# Patient Record
Sex: MALE | Race: WHITE | Employment: FULL TIME | URBAN - METROPOLITAN AREA
[De-identification: names, ages, dates, MRNs, and addresses within clinical notes are randomized per-mention and may not be internally consistent; named-entity substitution may affect disease eponyms.]

---

## 2017-01-13 RX ORDER — DESVENLAFAXINE SUCCINATE 100 MG/1
TABLET, EXTENDED RELEASE ORAL
Qty: 30 TAB | Refills: 5 | Status: SHIPPED | OUTPATIENT
Start: 2017-01-13 | End: 2017-06-14

## 2017-03-19 DIAGNOSIS — F90.0 ATTENTION DEFICIT HYPERACTIVITY DISORDER (ADHD), PREDOMINANTLY INATTENTIVE TYPE: Chronic | ICD-10-CM

## 2017-03-21 RX ORDER — ATOMOXETINE HYDROCHLORIDE 80 MG/1
CAPSULE ORAL
Qty: 30 CAP | Refills: 5 | Status: SHIPPED | OUTPATIENT
Start: 2017-03-21 | End: 2017-06-14

## 2017-06-14 ENCOUNTER — OFFICE VISIT (OUTPATIENT)
Dept: FAMILY MEDICINE CLINIC | Age: 27
End: 2017-06-14

## 2017-06-14 VITALS
BODY MASS INDEX: 32.51 KG/M2 | RESPIRATION RATE: 14 BRPM | OXYGEN SATURATION: 96 % | HEIGHT: 72 IN | TEMPERATURE: 98.8 F | WEIGHT: 240 LBS | DIASTOLIC BLOOD PRESSURE: 95 MMHG | HEART RATE: 87 BPM | SYSTOLIC BLOOD PRESSURE: 134 MMHG

## 2017-06-14 DIAGNOSIS — F41.8 MIXED ANXIETY DEPRESSIVE DISORDER: ICD-10-CM

## 2017-06-14 DIAGNOSIS — F90.0 ATTENTION DEFICIT HYPERACTIVITY DISORDER (ADHD), PREDOMINANTLY INATTENTIVE TYPE: Primary | Chronic | ICD-10-CM

## 2017-06-14 RX ORDER — SERTRALINE HYDROCHLORIDE 100 MG/1
100 TABLET, FILM COATED ORAL DAILY
Qty: 30 TAB | Refills: 5 | Status: SHIPPED | OUTPATIENT
Start: 2017-06-14 | End: 2022-05-16

## 2017-06-14 RX ORDER — SERTRALINE HYDROCHLORIDE 100 MG/1
TABLET, FILM COATED ORAL DAILY
COMMUNITY
End: 2017-06-14 | Stop reason: SDUPTHER

## 2017-06-14 RX ORDER — CLONAZEPAM 2 MG/1
TABLET ORAL 3 TIMES DAILY
COMMUNITY
End: 2022-05-16

## 2017-06-14 NOTE — PROGRESS NOTES
Venkat Cross is a 32 y.o. male who was seen in clinic today (6/16/2017). Subjective: Anxiety  Patient seen for follow up of anxiety and depressive disorder and ADD. Patient previously seen by psychiatry but is searching for a new psychiatrist. He has the following symptoms: racing thoughts, psychomotor agitation, feelings of losing control, difficulty concentrating, weight gain, hypersomnia and fatigue. Onset of symptoms was approximately several years ago, unchanged since that time. He requests stimulant medication for ADD. Also requests refills of Clonazepam and Sertraline. Patient has a h/o marijuana use. Prior to Admission medications    Medication Sig Start Date End Date Taking? Authorizing Provider   clonazePAM (KLONOPIN) 2 mg tablet Take  by mouth three (3) times daily. Yes Historical Provider   sertraline (ZOLOFT) 100 mg tablet Take 1 Tab by mouth daily. For depression 6/14/17  Yes Montana Hill NP   QUEtiapine (SEROQUEL) 25 mg tablet Take 1 Tab by mouth nightly. For sleep 6/8/16  Yes Marva Alfonso MD          No Known Allergies        ROS  See HPI    Objective:   Physical Exam   Constitutional: He is oriented to person, place, and time. He appears well-developed and well-nourished. Cardiovascular: Normal rate, regular rhythm and intact distal pulses. Exam reveals no gallop and no friction rub. No murmur heard. Pulmonary/Chest: Effort normal and breath sounds normal. No respiratory distress. Neurological: He is alert and oriented to person, place, and time. Psychiatric: He has a normal mood and affect. His speech is normal and behavior is normal. Thought content normal.   Nursing note and vitals reviewed.         Visit Vitals    BP (!) 134/95 (BP 1 Location: Right arm, BP Patient Position: Sitting)    Pulse 87    Temp 98.8 °F (37.1 °C) (Oral)    Resp 14    Ht 6' (1.829 m)    Wt 240 lb (108.9 kg)    SpO2 96%    BMI 32.55 kg/m2       Assessment & Plan:  Lance Luu was seen today for attention deficit disorder and other. Diagnoses and all orders for this visit:    Attention deficit hyperactivity disorder (ADHD), predominantly inattentive type  Patient needs formal neuropsychological testing. Given his psychiatric and substance abuse history with request psychiatry to manage his medications.   -     REFERRAL TO PSYCHIATRY  -     REFERRAL TO PSYCHOLOGY    Mixed anxiety depressive disorder  Discussed that benzodiazepines were not a long term treatment option for anxiety. Will refill Sertraline but not Clonazepam.   -     REFERRAL TO PSYCHIATRY  -     sertraline (ZOLOFT) 100 mg tablet; Take 1 Tab by mouth daily. For depression      I have discussed the diagnosis with the patient and the intended plan as seen in the above orders. The patient has received an after-visit summary along with patient information handout. I have discussed medication side effects and warnings with the patient as well. Follow-up Disposition:  Return if symptoms worsen or fail to improve.         Del Vaughn NP

## 2017-06-14 NOTE — MR AVS SNAPSHOT
Visit Information Date & Time Provider Department Dept. Phone Encounter #  
 6/14/2017  5:00 PM Yumi Pepe  [facility] [address] [phone] [id number] Upcoming Health Maintenance Date Due INFLUENZA AGE 9 TO ADULT 8/1/2017 DTaP/Tdap/Td series (7 - Td) 6/30/2019 Allergies as of 6/14/2017  Review Complete On: 6/14/2017 By: Yumi Pepe NP No Known Allergies Current Immunizations  Reviewed on 6/8/2016 Name Date DTAP Vaccine 8/12/1996, 4/14/1992, 7/2/1991, 4/30/1991, 2/26/1991 HIB Vaccine 5/1/2002, 11/29/2001, 10/24/2001 IPV 8/12/1996, 4/14/1992, 4/30/1991, 2/26/1991 Influenza Vaccine Whole 10/26/2006 MMR Vaccine 8/12/1996, 4/14/1992 Meningococcal Vaccine 6/12/2009 TDAP Vaccine 6/30/2009 Not reviewed this visit You Were Diagnosed With   
  
 Codes Comments Attention deficit hyperactivity disorder (ADHD), predominantly inattentive type    -  Primary ICD-10-CM: F90.0 ICD-9-CM: 314.00 Mixed anxiety depressive disorder     ICD-10-CM: F41.8 ICD-9-CM: 300.4 Vitals BP Pulse Temp Resp Height(growth percentile) Weight(growth percentile) (!) 134/95 (BP 1 Location: Right arm, BP Patient Position: Sitting) 87 98.8 °F (37.1 °C) (Oral) 14 6' (1.829 m) 240 lb (108.9 kg) SpO2 BMI Smoking Status 96% 32.55 kg/m2 Current Every Day Smoker Vitals History BMI and BSA Data Body Mass Index Body Surface Area 32.55 kg/m 2 2.35 m 2 Preferred Pharmacy Pharmacy Name Phone [id number], [address] [place] [address] [phone] Your Updated Medication List  
  
   
This list is accurate as of: 6/14/17  6:16 PM.  Always use your most recent med list.  
  
  
  
  
 KlonoPIN 2 mg tablet Generic drug:  clonazePAM  
Take  by mouth three (3) times daily. QUEtiapine 25 mg tablet Commonly known as:  SEROquel Take 1 Tab by mouth nightly. For sleep  
  
 sertraline 100 mg tablet Commonly known as:  ZOLOFT Take 1 Tab by mouth daily. For depression Prescriptions Sent to Pharmacy Refills  
 sertraline (ZOLOFT) 100 mg tablet 5 Sig: Take 1 Tab by mouth daily. For depression Class: Normal  
 [facility], [address] [facility] #: [phone] Route: Oral  
  
We Performed the Following REFERRAL TO PSYCHIATRY [REF91 Custom] Comments:  
 Please evaluate patient for ADD, anxiety and depression. REFERRAL TO PSYCHOLOGY [QRU84 Custom] Comments:  
 Please evaluate patient for ADD. Referral Information Referral ID Referred By Referred To  
  
 [id number] Abner Pink MD [address] [facility] Corby Villalba 33 Phone: [phone] Fax: [phone] Visits Status Start Date End Date 1 New Request 6/14/17 6/14/18 If your referral has a status of pending review or denied, additional information will be sent to support the outcome of this decision. Referral ID Referred By Referred To  
 [id number] [place], [address] Loni Prescott, PHD  
   [address] [place] [address] Phone: [phone] Fax: [phone] Visits Status Start Date End Date 1 New Request 6/14/17 6/14/18 If your referral has a status of pending review or denied, additional information will be sent to support the outcome of this decision. Patient Instructions Attention Deficit Hyperactivity Disorder (ADHD) in Adults: Care Instructions Your Care Instructions Attention deficit hyperactivity disorder, or ADHD, is a condition that makes it hard to pay attention. So you may have problems when you try to focus, get organized, and finish tasks.  It might make you more active than other people. Or you might do things without thinking first. 
ADHD is very common. It usually starts in early childhood. Many adults don't realize they have it until their children are diagnosed. Then they become aware of their own symptoms. Doctors don't know what causes ADHD. But it often runs in families. ADHD can be treated with medicines, behavior training, and counseling. Treatment can improve your life. Follow-up care is a key part of your treatment and safety. Be sure to make and go to all appointments, and call your doctor if you are having problems. It's also a good idea to know your test results and keep a list of the medicines you take. How can you care for yourself at home? · Learn all you can about ADHD. This will help you and your family understand it better. · Take your medicines exactly as prescribed. Call your doctor if you think you are having a problem with your medicine. You will get more details on the specific medicines your doctor prescribes. · If you miss a dose of your medicine, do not take an extra dose. · If your doctor suggests counseling, find a counselor you like and trust. Talk openly and honestly. Be willing to make some changes. · Find a support group for adults with ADHD. Talking to others with the same problems can help you feel better. It can also give you ideas about how to best cope with the condition. · Get rid of distractions at your work space. Keep your desk clean. Try not to face a window or busy hallway. · Use files, planners, and other tools to keep you organized. · Limit use of alcohol, and do not use illegal drugs. People with ADHD tend to become addicted more easily than others. Tell your doctor if you need help to quit. Counseling, support groups, and sometimes medicines can help you stay free of alcohol or drugs. · Get at least 30 minutes of physical activity on most days of the week. Exercise has been shown to help people cope with ADHD. Walking is a good choice. You also may want to do other activities, such as running, swimming, cycling, or playing tennis or team sports. When should you call for help? Watch closely for changes in your health, and be sure to contact your doctor if: 
· You feel sad a lot or cry all the time. · You have trouble sleeping, or you sleep too much. · You find it hard to concentrate, make decisions, or remember things. · You change how you normally eat. · You feel guilty for no reason. Where can you learn more? Go to http://yury-joo.info/. Enter B196 in the search box to learn more about \"Attention Deficit Hyperactivity Disorder (ADHD) in Adults: Care Instructions. \" Current as of: July 26, 2016 Content Version: 11.2 © 3798-1354 [employer]. Care instructions adapted under license by [employer] (which disclaims liability or warranty for this information). If you have questions about a medical condition or this instruction, always ask your healthcare professional. [address] any warranty or liability for your use of this information. Introducing [facility] & HEALTH SERVICES! Dear Jessenia Leon: Thank you for requesting a [employer] account. Our records indicate that you have previously registered for a [employer] account but its currently inactive. Please call our [employer] support line at [phone]. Additional Information If you have questions, please visit the Frequently Asked Questions section of the [employer] website at [email or web address] Remember, [employer] is NOT to be used for urgent needs. For medical emergencies, dial 911. Now available from your iPhone and Android! Please provide this summary of care documentation to your next provider. Your primary care clinician is listed as Joel Bray.  If you have any questions after today's visit, please call [phone].

## 2017-06-14 NOTE — PROGRESS NOTES
Chief Complaint   Patient presents with    Attention Deficit Disorder    Other     Pt states that he got new insurance in january, seen psychiatrist at [facility] 5/2017     Pt states he has not been able to concentrate at work due to his add  Med list reviewed  1. Have you been to the ER, urgent care clinic since your last visit? Hospitalized since your last visit? No    2. Have you seen or consulted any other health care providers outside of the [address] [place] [address] since your last visit? Include any pap smears or colon screening.  Yes When: 5/2017 Dr. Moshe De La O at [address] [facility] DOCUMENTATION\"

## 2017-06-14 NOTE — PATIENT INSTRUCTIONS
Attention Deficit Hyperactivity Disorder (ADHD) in Adults: Care Instructions  Your Care Instructions  Attention deficit hyperactivity disorder, or ADHD, is a condition that makes it hard to pay attention. So you may have problems when you try to focus, get organized, and finish tasks. It might make you more active than other people. Or you might do things without thinking first.  ADHD is very common. It usually starts in early childhood. Many adults don't realize they have it until their children are diagnosed. Then they become aware of their own symptoms. Doctors don't know what causes ADHD. But it often runs in families. ADHD can be treated with medicines, behavior training, and counseling. Treatment can improve your life. Follow-up care is a key part of your treatment and safety. Be sure to make and go to all appointments, and call your doctor if you are having problems. It's also a good idea to know your test results and keep a list of the medicines you take. How can you care for yourself at home? · Learn all you can about ADHD. This will help you and your family understand it better. · Take your medicines exactly as prescribed. Call your doctor if you think you are having a problem with your medicine. You will get more details on the specific medicines your doctor prescribes. · If you miss a dose of your medicine, do not take an extra dose. · If your doctor suggests counseling, find a counselor you like and trust. Talk openly and honestly. Be willing to make some changes. · Find a support group for adults with ADHD. Talking to others with the same problems can help you feel better. It can also give you ideas about how to best cope with the condition. · Get rid of distractions at your work space. Keep your desk clean. Try not to face a window or busy hallway. · Use files, planners, and other tools to keep you organized. · Limit use of alcohol, and do not use illegal drugs.  People with ADHD tend to become addicted more easily than others. Tell your doctor if you need help to quit. Counseling, support groups, and sometimes medicines can help you stay free of alcohol or drugs. · Get at least 30 minutes of physical activity on most days of the week. Exercise has been shown to help people cope with ADHD. Walking is a good choice. You also may want to do other activities, such as running, swimming, cycling, or playing tennis or team sports. When should you call for help? Watch closely for changes in your health, and be sure to contact your doctor if:  · You feel sad a lot or cry all the time. · You have trouble sleeping, or you sleep too much. · You find it hard to concentrate, make decisions, or remember things. · You change how you normally eat. · You feel guilty for no reason. Where can you learn more? Go to http://yury-joo.info/. Enter B196 in the search box to learn more about \"Attention Deficit Hyperactivity Disorder (ADHD) in Adults: Care Instructions. \"  Current as of: July 26, 2016  Content Version: 11.2  © 9978-7510 [employer]. Care instructions adapted under license by [employer] (which disclaims liability or warranty for this information). If you have questions about a medical condition or this instruction, always ask your healthcare professional. [address] any warranty or liability for your use of this information.

## 2017-12-06 ENCOUNTER — OFFICE VISIT (OUTPATIENT)
Dept: FAMILY MEDICINE CLINIC | Age: 27
End: 2017-12-06

## 2017-12-06 VITALS
OXYGEN SATURATION: 96 % | BODY MASS INDEX: 31.29 KG/M2 | TEMPERATURE: 100.6 F | RESPIRATION RATE: 16 BRPM | WEIGHT: 231 LBS | HEIGHT: 72 IN | SYSTOLIC BLOOD PRESSURE: 120 MMHG | HEART RATE: 88 BPM | DIASTOLIC BLOOD PRESSURE: 97 MMHG

## 2017-12-06 DIAGNOSIS — B34.9 VIRAL INFECTION: Primary | ICD-10-CM

## 2017-12-06 LAB
QUICKVUE INFLUENZA TEST: NEGATIVE
S PYO AG THROAT QL: NEGATIVE
VALID INTERNAL CONTROL?: YES
VALID INTERNAL CONTROL?: YES

## 2017-12-06 RX ORDER — AMOXICILLIN 875 MG/1
875 TABLET, FILM COATED ORAL 2 TIMES DAILY
Qty: 20 TAB | Refills: 0 | Status: SHIPPED | OUTPATIENT
Start: 2017-12-06 | End: 2017-12-16

## 2017-12-06 NOTE — PROGRESS NOTES
Chief Complaint   Patient presents with    Cough     persistent cough- coughing up yellow mucus- started 3 days ago- tried OTC Robitussin- uneffective    Sore Throat     started 3 days ago    Nasal Congestion     runny, stuffy nose- started 3 days ago       1. Have you been to the ER, urgent care clinic since your last visit? Hospitalized since your last visit? No    2. Have you seen or consulted any other health care providers outside of the [address] [place] [address] since your last visit? Include any pap smears or colon screening.  No

## 2017-12-06 NOTE — MR AVS SNAPSHOT
Visit Information Date & Time Provider Department Dept. Phone Encounter #  
 12/6/2017  2:45 PM Guru Cuevas, [address] [facility] [address] [phone] [id number] Upcoming Health Maintenance Date Due DTaP/Tdap/Td series (7 - Td) 6/30/2019 Allergies as of 12/6/2017  Review Complete On: 12/6/2017 By: Jadyn Ortiz LPN No Known Allergies Current Immunizations  Reviewed on 6/8/2016 Name Date DTAP Vaccine 8/12/1996, 4/14/1992, 7/2/1991, 4/30/1991, 2/26/1991 HIB Vaccine 5/1/2002, 11/29/2001, 10/24/2001 IPV 8/12/1996, 4/14/1992, 4/30/1991, 2/26/1991 Influenza Vaccine Whole 10/26/2006 MMR Vaccine 8/12/1996, 4/14/1992 Meningococcal Vaccine 6/12/2009 TDAP Vaccine 6/30/2009 Not reviewed this visit You Were Diagnosed With   
  
 Codes Comments Viral infection    -  Primary ICD-10-CM: B34.9 ICD-9-CM: 079.99 Vitals BP Pulse Temp Resp Height(growth percentile) Weight(growth percentile) (!) 120/97 (BP 1 Location: Right arm, BP Patient Position: Sitting) 88 (!) 100.6 °F (38.1 °C) (Oral) 16 6' (1.829 m) 231 lb (104.8 kg) SpO2 BMI Smoking Status 96% 31.33 kg/m2 Current Every Day Smoker Vitals History BMI and BSA Data Body Mass Index Body Surface Area  
 31.33 kg/m 2 2.31 m 2 Preferred Pharmacy [id number], [address] [place] [address] [phone] Your Updated Medication List  
  
   
This list is accurate as of: 12/6/17  3:33 PM.  Always use your most recent med list.  
  
  
  
  
 amoxicillin 875 mg tablet Commonly known as:  AMOXIL Take 1 Tab by mouth two (2) times a day for 10 days. KlonoPIN 2 mg tablet Generic drug:  clonazePAM  
Take  by mouth three (3) times daily. QUEtiapine 25 mg tablet Commonly known as:  SEROquel Take 1 Tab by mouth nightly. For sleep sertraline 100 mg tablet Commonly known as:  ZOLOFT Take 1 Tab by mouth daily. For depression Prescriptions Printed Refills  
 amoxicillin (AMOXIL) 875 mg tablet 0 Sig: Take 1 Tab by mouth two (2) times a day for 10 days. Class: Print Route: Oral  
  
We Performed the Following AMB POC RAPID INFLUENZA TEST [22564 CPT(R)] AMB POC RAPID STREP A [34768 CPT(R)] Patient Instructions Viral Infections: Care Instructions Your Care Instructions You don't feel well, but it's not clear what's causing it. You may have a viral infection. Viruses cause many illnesses, such as the common cold, influenza, fever, rashes, and the diarrhea, nausea, and vomiting that are often called \"stomach flu. \" You may wonder if antibiotic medicines could make you feel better. But antibiotics only treat infections caused by bacteria. They don't work on viruses. The good news is that viral infections usually aren't serious. Most will go away in a few days without medical treatment. In the meantime, there are a few things you can do to make yourself more comfortable. Follow-up care is a key part of your treatment and safety. Be sure to make and go to all appointments, and call your doctor if you are having problems. It's also a good idea to know your test results and keep a list of the medicines you take. How can you care for yourself at home? · Get plenty of rest if you feel tired. · Take an over-the-counter pain medicine if needed, such as acetaminophen (Tylenol), ibuprofen (Advil, Motrin), or naproxen (Aleve). Read and follow all instructions on the label. · Be careful when taking over-the-counter cold or flu medicines and Tylenol at the same time. Many of these medicines have acetaminophen, which is Tylenol. Read the labels to make sure that you are not taking more than the recommended dose. Too much acetaminophen (Tylenol) can be harmful. · Drink plenty of fluids, enough so that your urine is light yellow or clear like water. If you have kidney, heart, or liver disease and have to limit fluids, talk with your doctor before you increase the amount of fluids you drink. · Stay home from work, school, and other public places while you have a fever. When should you call for help? Call 911 anytime you think you may need emergency care. For example, call if: 
? · You have severe trouble breathing. ? · You passed out (lost consciousness). ?Call your doctor now or seek immediate medical care if: 
? · You seem to be getting much sicker. ? · You have a new or higher fever. ? · You have blood in your stools. ? · You have new belly pain, or your pain gets worse. ? · You have a new rash. ? Watch closely for changes in your health, and be sure to contact your doctor if: 
? · You start to get better and then get worse. ? · You do not get better as expected. Where can you learn more? Go to http://yury-joo.info/. Enter R919 in the search box to learn more about \"Viral Infections: Care Instructions. \" Current as of: March 3, 2017 Content Version: 11.4 © 6947-2233 Engiver. Care instructions adapted under license by [employer] (which disclaims liability or warranty for this information). If you have questions about a medical condition or this instruction, always ask your healthcare professional. [place] [address] any warranty or liability for your use of this information. Introducing [facility] & HEALTH SERVICES! Dear Mary Anne Coppola: Thank you for requesting a [employer] account. Our records indicate that you have previously registered for a [employer] account but its currently inactive. Please call our [employer] support line at [phone]. Additional Information If you have questions, please visit the Frequently Asked Questions section of the [employer] website at [email or web address] Remember, [employer] is NOT to be used for urgent needs. For medical emergencies, dial 911. Now available from your iPhone and Android! Please provide this summary of care documentation to your next provider. Your primary care clinician is listed as Carolee Correa. If you have any questions after today's visit, please call [phone].

## 2017-12-06 NOTE — PROGRESS NOTES
Tiffany Owusu is a 32 y.o. male who was seen in clinic today (12/6/2017). Subjective:  Upper Respiratory Infection  Patient complains of symptoms of a URI. Symptoms include congestion, fever and cough. Onset of symptoms was 4 days ago, gradually worsening since that time. He also c/o achiness, congestion, coryza, cough described as non-productive, without wheezing, dyspnea or hemoptysis, low grade fever, post nasal drip, purulent nasal discharge and sore throat for the past 4 days . He is drinking plenty of fluids. . Evaluation to date: none. Treatment to date: none. Prior to Admission medications    Medication Sig Start Date End Date Taking? Authorizing Provider   amoxicillin (AMOXIL) 875 mg tablet Take 1 Tab by mouth two (2) times a day for 10 days. 12/6/17 12/16/17 Yes Cheryl Castanon NP   clonazePAM (KLONOPIN) 2 mg tablet Take  by mouth three (3) times daily. Historical Provider   sertraline (ZOLOFT) 100 mg tablet Take 1 Tab by mouth daily. For depression 6/14/17   Cheryl Castanon NP   QUEtiapine (SEROQUEL) 25 mg tablet Take 1 Tab by mouth nightly. For sleep 6/8/16   Jassi Aden MD          No Known Allergies        ROS  See HPI    Objective:   Physical Exam   Constitutional: He is oriented to person, place, and time. He appears well-developed and well-nourished. No distress. HENT:   Right Ear: Tympanic membrane and ear canal normal.   Left Ear: Tympanic membrane and ear canal normal.   Nose: Mucosal edema present. Right sinus exhibits no maxillary sinus tenderness and no frontal sinus tenderness. Left sinus exhibits no maxillary sinus tenderness and no frontal sinus tenderness. Mouth/Throat: Oropharynx is clear and moist.   Cardiovascular: Normal rate, regular rhythm and normal heart sounds. No murmur heard. Pulmonary/Chest: Effort normal and breath sounds normal. He has no decreased breath sounds. He has no wheezes. He has no rhonchi.    Lymphadenopathy:     He has no cervical adenopathy. Neurological: He is alert and oriented to person, place, and time. Psychiatric: He has a normal mood and affect. His behavior is normal.   Nursing note and vitals reviewed. Visit Vitals    BP (!) 120/97 (BP 1 Location: Right arm, BP Patient Position: Sitting)    Pulse 88    Temp (!) 100.6 °F (38.1 °C) (Oral)    Resp 16    Ht 6' (1.829 m)    Wt 231 lb (104.8 kg)    SpO2 96%    BMI 31.33 kg/m2       Assessment & Plan:  Diagnoses and all orders for this visit:    1. Viral infection  Discussed that symptoms were viral and recommended treating symptoms. Increase fluids and rest. Reviewed OTC products that patient could take. -     AMB POC RAPID STREP A: negative  -     AMB POC RAPID INFLUENZA TEST: negative  -     Delay fill amoxicillin (AMOXIL) 875 mg tablet; Take 1 Tab by mouth two (2) times a day for 10 days. I have discussed the diagnosis with the patient and the intended plan as seen in the above orders. The patient has received an after-visit summary along with patient information handout. I have discussed medication side effects and warnings with the patient as well. Follow-up Disposition:  Return if symptoms worsen or fail to improve.         Eliezer Napoles, BERTRAM

## 2017-12-06 NOTE — PATIENT INSTRUCTIONS
Viral Infections: Care Instructions  Your Care Instructions    You don't feel well, but it's not clear what's causing it. You may have a viral infection. Viruses cause many illnesses, such as the common cold, influenza, fever, rashes, and the diarrhea, nausea, and vomiting that are often called \"stomach flu. \" You may wonder if antibiotic medicines could make you feel better. But antibiotics only treat infections caused by bacteria. They don't work on viruses. The good news is that viral infections usually aren't serious. Most will go away in a few days without medical treatment. In the meantime, there are a few things you can do to make yourself more comfortable. Follow-up care is a key part of your treatment and safety. Be sure to make and go to all appointments, and call your doctor if you are having problems. It's also a good idea to know your test results and keep a list of the medicines you take. How can you care for yourself at home? · Get plenty of rest if you feel tired. · Take an over-the-counter pain medicine if needed, such as acetaminophen (Tylenol), ibuprofen (Advil, Motrin), or naproxen (Aleve). Read and follow all instructions on the label. · Be careful when taking over-the-counter cold or flu medicines and Tylenol at the same time. Many of these medicines have acetaminophen, which is Tylenol. Read the labels to make sure that you are not taking more than the recommended dose. Too much acetaminophen (Tylenol) can be harmful. · Drink plenty of fluids, enough so that your urine is light yellow or clear like water. If you have kidney, heart, or liver disease and have to limit fluids, talk with your doctor before you increase the amount of fluids you drink. · Stay home from work, school, and other public places while you have a fever. When should you call for help? Call 911 anytime you think you may need emergency care. For example, call if:  ? · You have severe trouble breathing.    ? · You passed out (lost consciousness). ?Call your doctor now or seek immediate medical care if:  ? · You seem to be getting much sicker. ? · You have a new or higher fever. ? · You have blood in your stools. ? · You have new belly pain, or your pain gets worse. ? · You have a new rash. ? Watch closely for changes in your health, and be sure to contact your doctor if:  ? · You start to get better and then get worse. ? · You do not get better as expected. Where can you learn more? Go to http://yury-joo.info/. Enter L259 in the search box to learn more about \"Viral Infections: Care Instructions. \"  Current as of: March 3, 2017  Content Version: 11.4  © 0623-8648 [employer]. Care instructions adapted under license by [employer] (which disclaims liability or warranty for this information). If you have questions about a medical condition or this instruction, always ask your healthcare professional. [address] any warranty or liability for your use of this information.

## 2022-05-16 ENCOUNTER — OFFICE VISIT (OUTPATIENT)
Dept: FAMILY MEDICINE CLINIC | Age: 32
End: 2022-05-16
Payer: COMMERCIAL

## 2022-05-16 VITALS
WEIGHT: 186 LBS | SYSTOLIC BLOOD PRESSURE: 130 MMHG | BODY MASS INDEX: 25.19 KG/M2 | HEART RATE: 73 BPM | TEMPERATURE: 98.6 F | OXYGEN SATURATION: 98 % | HEIGHT: 72 IN | DIASTOLIC BLOOD PRESSURE: 78 MMHG | RESPIRATION RATE: 16 BRPM

## 2022-05-16 DIAGNOSIS — Z11.59 NEED FOR HEPATITIS C SCREENING TEST: ICD-10-CM

## 2022-05-16 DIAGNOSIS — Z23 ENCOUNTER FOR IMMUNIZATION: ICD-10-CM

## 2022-05-16 DIAGNOSIS — F34.1 DYSTHYMIA: ICD-10-CM

## 2022-05-16 DIAGNOSIS — Z00.00 PHYSICAL EXAM: Primary | ICD-10-CM

## 2022-05-16 PROCEDURE — 99395 PREV VISIT EST AGE 18-39: CPT | Performed by: FAMILY MEDICINE

## 2022-05-16 RX ORDER — FINASTERIDE 1 MG/1
1 TABLET, FILM COATED ORAL DAILY
COMMUNITY

## 2022-05-16 NOTE — PROGRESS NOTES
Chief Complaint   Patient presents with    Complete Physical     1. \"Have you been to the ER, urgent care clinic since your last visit? Hospitalized since your last visit? \" no    2. \"Have you seen or consulted any other health care providers outside of the [address] [place] [address] since your last visit? \"  no    3. For patients aged 39-70: Has the patient had a colonoscopy / FIT/ Cologuard? No gap       If the patient is female:    4. For patients aged 41-77: Has the patient had a mammogram within the past 2 years? 5. For patients aged 21-65: Has the patient had a pap smear?

## 2022-05-16 NOTE — PROGRESS NOTES
HPI  Rogelio Lentz 32 y.o. male  presents to the office today for a CPE. Pt is not fasting this morning (had large salad). Dysthymia: Pt reports some feelings of depression and fatigue. He believes this is mostly situational. 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 He now gets more regular time off. He is no longer taking clonazepam, Zoloft, or Seroquel. He reports he is doing okay without medication. If this changes he will let me know. ADD: Pt reports he has difficulty focusing and decreased attention span. He would like to go back to school, but is concerned about his ADD. He last saw neuropsychologist, Dr. Landon Dan around 2017 and is considering seeing him again. Pt was previously taking Strattera. 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 Pt started finasteride 1mg daily 4 days ago-- prescribed by Dr. Ruslan De Jesus, dermatologist. He notes his brother also tried this medication and it didn't work well for him. Elevated BP: Pt's BP today was 148/100 and 138/78 on manual repeat. Pt reports BP is typically good. Pt's weight today is 186lb, down from 231lb on 12/6/17. Health Maintenance:   Last tetanus on record was 2009-- pt will receive booster today. Pt has not had recent COVID booster. Blood pressure 130/78, pulse 73, temperature 98.6 °F (37 °C), temperature source Temporal, resp. rate 16, height 6' (1.829 m), weight 186 lb (84.4 kg), SpO2 98 %. Body mass index is 25.23 kg/m². Chief Complaint   Patient presents with    Complete Physical        Current Outpatient Medications   Medication Sig Dispense Refill    finasteride (PROPECIA) 1 mg tablet Take 1 mg by mouth daily.        No Known Allergies  Past Medical History:   Diagnosis Date    ADD (attention deficit disorder with hyperactivity) 2/26/2010    Depressive disorder, not elsewhere classified 2/26/2010    Epididymitis 2/26/2010    Other acne 2/26/2010    Seizure [facility] ([facility]) 2/26/2010    Unspecified asthma(493.90) 2/26/2010     History reviewed. No pertinent surgical history. Family History   Problem Relation Age of Onset    Headache Mother     Depression Father     Depression Brother     Hypertension Maternal Grandmother     Hypertension Maternal Grandfather     COPD Maternal Grandfather      Social History     Tobacco Use    Smoking status: Former Smoker     Packs/day: 0.25     Types: Cigarettes     Start date: 2017    Smokeless tobacco: Never Used   Substance Use Topics    Alcohol use: No        Review of Systems   Constitutional: Positive for malaise/fatigue. Negative for chills and fever. HENT: Negative for hearing loss and tinnitus. Eyes: Negative for blurred vision and double vision. Respiratory: Negative for cough and shortness of breath. Cardiovascular: Negative for chest pain and palpitations. Gastrointestinal: Negative for nausea and vomiting. Genitourinary: Negative for dysuria and frequency. Musculoskeletal: Negative for back pain and falls. Skin: Negative for itching and rash. Neurological: Negative for dizziness and headaches. Endo/Heme/Allergies: Negative. Psychiatric/Behavioral: Positive for depression. The patient is not nervous/anxious. Physical Exam  Vitals reviewed. Constitutional:       Appearance: Normal appearance. HENT:      Head: Normocephalic and atraumatic. Right Ear: Tympanic membrane, ear canal and external ear normal.      Left Ear: Tympanic membrane, ear canal and external ear normal.      Nose: Nose normal.      Mouth/Throat:      Mouth: Mucous membranes are moist.      Pharynx: Oropharynx is clear. Eyes:      Extraocular Movements: Extraocular movements intact. Conjunctiva/sclera: Conjunctivae normal.      Pupils: Pupils are equal, round, and reactive to light. Cardiovascular:      Rate and Rhythm: Normal rate and regular rhythm. Pulses: Normal pulses.       Heart sounds: Normal heart sounds. Pulmonary:      Effort: Pulmonary effort is normal.      Breath sounds: Normal breath sounds. Abdominal:      General: Abdomen is flat. Bowel sounds are normal.      Palpations: Abdomen is soft. Musculoskeletal:         General: Normal range of motion. Cervical back: Normal range of motion and neck supple. Skin:     General: Skin is warm and dry. Neurological:      General: No focal deficit present. Mental Status: He is alert and oriented to person, place, and time. Psychiatric:         Mood and Affect: Mood normal.         Behavior: Behavior normal.         Judgment: Judgment normal.       ASSESSMENT and PLAN  Diagnoses and all orders for this visit:    1. Physical exam  Pt presents today for a CPE, which I performed. All findings were normal. Pt will have updated lab work performed during today's [facility] the [facility]; Future  -     CBC WITH AUTOMATED DIFF; Future  -     LIPID PANEL; Future  -     TSH 3RD GENERATION; Future  -     T4, FREE; Future  -     URINALYSIS W/MICROSCOPIC; Future    2. Need for hepatitis C screening test  Pt is due for routine Hep C screening.   -     HEPATITIS C AB; Future    3. Dysthymia  Pt reports some feelings of depression and fatigue. He believes this is mostly situational. He is no longer taking clonazepam, Zoloft, or Seroquel. He reports he is doing okay without medication. If this changes he will let me know. 4. Encounter for immunization  Pt is due for tdap vaccine-- administered today. -     TETANUS, DIPHTHERIA TOXOIDS AND ACELLULAR PERTUSSIS VACCINE (TDAP), IN INDIVIDS. >=7, IM    Pt advised to follow up with his neuropsychologist regarding his ADD concerns. Follow-up and Dispositions    · Return in about 1 year (around 5/16/2023) for physical.        Medication risks/benefits/costs/interactions/alternatives discussed with patient.   Advised patient to call back or return to office if symptoms worsen/change/persist.  If patient cannot reach us or should anything more severe/urgent arise he/she should proceed directly to the nearest emergency department. Discussed expected course/resolution/complications of diagnosis in detail with patient. Patient given a written after visit summary which includes diagnoses, current medications and vitals. Patient expressed understanding with the diagnosis and plan. Written by raina Borja, as dictated by Keyon España M.D.    11:50 AM - 12:15 PM    Total time spent with the patient 20 minutes, greater than 50% of time spent counseling patient.

## 2022-05-17 LAB
ALBUMIN SERPL-MCNC: 4.2 G/DL (ref 3.5–5)
ALBUMIN/GLOB SERPL: 1.3 {RATIO} (ref 1.1–2.2)
ALP SERPL-CCNC: 40 U/L (ref 45–117)
ALT SERPL-CCNC: 58 U/L (ref 12–78)
ANION GAP SERPL CALC-SCNC: 5 MMOL/L (ref 5–15)
APPEARANCE UR: CLEAR
AST SERPL-CCNC: 27 U/L (ref 15–37)
BACTERIA URNS QL MICRO: NEGATIVE /HPF
BASOPHILS # BLD: 0 K/UL (ref 0–0.1)
BASOPHILS NFR BLD: 1 % (ref 0–1)
BILIRUB SERPL-MCNC: 0.6 MG/DL (ref 0.2–1)
BILIRUB UR QL: NEGATIVE
BUN SERPL-MCNC: 25 MG/DL (ref 6–20)
BUN/CREAT SERPL: 20 (ref 12–20)
CALCIUM SERPL-MCNC: 9.6 MG/DL (ref 8.5–10.1)
CHLORIDE SERPL-SCNC: 105 MMOL/L (ref 97–108)
CHOLEST SERPL-MCNC: 136 MG/DL
CO2 SERPL-SCNC: 30 MMOL/L (ref 21–32)
COLOR UR: ABNORMAL
CREAT SERPL-MCNC: 1.22 MG/DL (ref 0.7–1.3)
DIFFERENTIAL METHOD BLD: ABNORMAL
EOSINOPHIL # BLD: 0.1 K/UL (ref 0–0.4)
EOSINOPHIL NFR BLD: 1 % (ref 0–7)
EPITH CASTS URNS QL MICRO: ABNORMAL /LPF
ERYTHROCYTE [DISTWIDTH] IN BLOOD BY AUTOMATED COUNT: 12 % (ref 11.5–14.5)
FERRITIN SERPL-MCNC: 120 NG/ML (ref 26–388)
GLOBULIN SER CALC-MCNC: 3.2 G/DL (ref 2–4)
GLUCOSE SERPL-MCNC: 75 MG/DL (ref 65–100)
GLUCOSE UR STRIP.AUTO-MCNC: NEGATIVE MG/DL
HCT VFR BLD AUTO: 55.7 % (ref 36.6–50.3)
HCV AB SERPL QL IA: NONREACTIVE
HDLC SERPL-MCNC: 45 MG/DL
HDLC SERPL: 3 {RATIO} (ref 0–5)
HGB BLD-MCNC: 18.2 G/DL (ref 12.1–17)
HGB UR QL STRIP: NEGATIVE
HYALINE CASTS URNS QL MICRO: ABNORMAL /LPF (ref 0–5)
IMM GRANULOCYTES # BLD AUTO: 0 K/UL (ref 0–0.04)
IMM GRANULOCYTES NFR BLD AUTO: 0 % (ref 0–0.5)
KETONES UR QL STRIP.AUTO: NEGATIVE MG/DL
LDLC SERPL CALC-MCNC: 68.8 MG/DL (ref 0–100)
LEUKOCYTE ESTERASE UR QL STRIP.AUTO: ABNORMAL
LYMPHOCYTES # BLD: 1.8 K/UL (ref 0.8–3.5)
LYMPHOCYTES NFR BLD: 27 % (ref 12–49)
MCH RBC QN AUTO: 31.2 PG (ref 26–34)
MCHC RBC AUTO-ENTMCNC: 32.7 G/DL (ref 30–36.5)
MCV RBC AUTO: 95.4 FL (ref 80–99)
MONOCYTES # BLD: 0.6 K/UL (ref 0–1)
MONOCYTES NFR BLD: 9 % (ref 5–13)
NEUTS SEG # BLD: 4.2 K/UL (ref 1.8–8)
NEUTS SEG NFR BLD: 62 % (ref 32–75)
NITRITE UR QL STRIP.AUTO: NEGATIVE
NRBC # BLD: 0 K/UL (ref 0–0.01)
NRBC BLD-RTO: 0 PER 100 WBC
PH UR STRIP: 7.5 [PH] (ref 5–8)
PLATELET # BLD AUTO: 240 K/UL (ref 150–400)
PMV BLD AUTO: 11.1 FL (ref 8.9–12.9)
POTASSIUM SERPL-SCNC: 4.1 MMOL/L (ref 3.5–5.1)
PROT SERPL-MCNC: 7.4 G/DL (ref 6.4–8.2)
PROT UR STRIP-MCNC: NEGATIVE MG/DL
RBC # BLD AUTO: 5.84 M/UL (ref 4.1–5.7)
RBC #/AREA URNS HPF: ABNORMAL /HPF (ref 0–5)
SODIUM SERPL-SCNC: 140 MMOL/L (ref 136–145)
SP GR UR REFRACTOMETRY: 1.02 (ref 1–1.03)
T4 FREE SERPL-MCNC: 0.9 NG/DL (ref 0.8–1.5)
TRIGL SERPL-MCNC: 111 MG/DL (ref ?–150)
TSH SERPL DL<=0.05 MIU/L-ACNC: 2.72 UIU/ML (ref 0.36–3.74)
UROBILINOGEN UR QL STRIP.AUTO: 1 EU/DL (ref 0.2–1)
VLDLC SERPL CALC-MCNC: 22.2 MG/DL
WBC # BLD AUTO: 6.7 K/UL (ref 4.1–11.1)
WBC URNS QL MICRO: ABNORMAL /HPF (ref 0–4)

## 2022-05-18 LAB — 1,25(OH)2D3 SERPL-MCNC: 45.9 PG/ML (ref 19.9–79.3)

## 2022-05-22 NOTE — PROGRESS NOTES
Patient's hemoglobin and hematocrit are elevated we need to recheck a CBC and do a peripheral smear. I have placed the orders please advise patient to come in for further blood work. The rest of his labs are stable.

## 2022-06-08 ENCOUNTER — OFFICE VISIT (OUTPATIENT)
Dept: ORTHOPEDIC SURGERY | Age: 32
End: 2022-06-08
Payer: COMMERCIAL

## 2022-06-08 VITALS — BODY MASS INDEX: 25.73 KG/M2 | WEIGHT: 190 LBS | HEIGHT: 72 IN

## 2022-06-08 DIAGNOSIS — M25.511 ACUTE PAIN OF RIGHT SHOULDER: Primary | ICD-10-CM

## 2022-06-08 DIAGNOSIS — R29.898 SHOULDER WEAKNESS: ICD-10-CM

## 2022-06-08 PROCEDURE — 99203 OFFICE O/P NEW LOW 30 MIN: CPT | Performed by: ORTHOPAEDIC SURGERY

## 2022-06-08 NOTE — PROGRESS NOTES
John Bolaños (: 1990) is a 32 y.o. male, patient, here for evaluation of the following chief complaint(s):  Shoulder Pain (right shoulder pain)       HPI:    Patient comes in today for evaluation of his right shoulder. He states approximately 8 months ago he was at [employer] working out. He was working on his benchMoPals. While he was pressing up he states his  put some counterpressure down on the bar. He feels that this tweaked his right shoulder. He had immediate sharp pain that he localizes over the anterior lateral aspect of his shoulder. He tried a workout a few more times but continued to experience discomfort especially with pressing motions or any motion above shoulder height. He decided to take a break. He did not workout at the gym for about 6 months. He just recently returned to the gym over the last month and states he was doing well. Unfortunately few weeks ago he was working and fell out of his truck and landed with his right arm outstretched. He felt immediate pain to his right shoulder again. He again is localizing it to the anterior lateral aspect but does get some referred pain into his deltoid. He denies any numbness or tingling distal to the elbow. He denies any other MSK injuries from that fall. He states since that time he just does not feel that is getting any better. He has noticed some clicking of his shoulder especially when he raises his arms overhead or lifts anything heavy. He has tried some over-the-counter anti-inflammatories and resting it is still giving him some discomfort. It does not wake him from sleep. Prior to 8 months ago he denies any injuries or issues with the shoulder. He is right-hand dominant. No Known Allergies    Current Outpatient Medications   Medication Sig    finasteride (PROPECIA) 1 mg tablet Take 1 mg by mouth daily. No current facility-administered medications for this visit.        Past Medical History: Diagnosis Date    ADD (attention deficit disorder with hyperactivity) 2/26/2010    Depressive disorder, not elsewhere classified 2/26/2010    Epididymitis 2/26/2010    Other acne 2/26/2010    Se[facility]ure disorder ([facility] [address] 2/26/2010    Unspecified asthma(493.90) 2/26/2010        No past surgical history on file. Family History   Problem Relation Age of Onset    Headache Mother     Depression Father     Depression Brother     Hypertension Maternal Grandmother     Hypertension Maternal Grandfather     COPD Maternal Grandfather         Social History     Socioeconomic History    Marital status: SINGLE     Spouse name: Not on file    Number of children: Not on file    Years of education: Not on file    Highest education level: Not on file   Occupational History    Not on file   Tobacco Use    Smoking status: Former Smoker     Packs/day: 0.25     Types: Cigarettes     Start date: 2017    Smokeless tobacco: Never Used   Vaping Use    Vaping Use: Never used   Substance and Sexual Activity    Alcohol use: No    Drug use: No    Sexual activity: Never   Other Topics Concern    Not on file   Social History Narrative    Not on file     Social Determinants of Health     Financial Resource Strain:     Difficulty of Paying Living Expenses: Not on file   Food Insecurity:     Worried About [address] in the Last Year: Not on file    [address] in the Last Year: Not on file   Transportation Needs:     Lack of Transportation (Medical): Not on file    Lack of Transportation (Non-Medical):  Not on file   Physical Activity:     Days of Exercise per Week: Not on file    Minutes of Exercise per Session: Not on file   Stress:     Feeling of Stress : Not on file   Social Connections:     Frequency of Communication with Friends and Family: Not on file    Frequency of Social Gatherings with Friends and Family: Not on file    Attends [employer] Services: Not on file   [employer]s or Organizations: Not on file    Attends Club or Organization Meetings: Not on file    Marital Status: Not on file   Intimate Partner Violence:     Fear of Current or Ex-Partner: Not on file    Emotionally Abused: Not on file    Physically Abused: Not on file    Sexually Abused: Not on file   Housing Stability:     Unable to Pay for Housing in the Last Year: Not on file    Number of Kevin in the Last Year: Not on file    Unstable Housing in the Last Year: Not on file       Review of Systems   Musculoskeletal:        Right shoulder pain       Vitals:  Ht 6' (1.829 m)   Wt 190 lb (86.2 kg)   BMI 25.77 kg/m²    Body mass index is 25.77 kg/m². Ortho Exam     General:  Well-nourished well-developed male. Alert and oriented. No acute distress. Pleasant on exam.  Normal affect and mood. Right upper extremity:  Skin is intact about the shoulder. No deformity or atrophy about the shoulder girdle. Checking the contour of his shoulders and his periscapular region no obvious atrophy or deformity. There is no ecchymosis, lacerations, lesions, erythema, warmth, or signs of infection. Mild tenderness over the [facility] joint. Nontender over the clavicle, acromion, spine scapula, long head of bicep. He is also nontender over his biceps muscle, deltoid muscle, triceps muscle, elbow, wrist, forearm, hand or fingers. Actively he is able to forward flex to 170 degrees and abduct about 170 degrees. He can externally rotate 80 degrees and internally rotate to his mid thoracic spine. There is palpable crepitus at his [facility] joint with active range of motion of the shoulder. He has 5 out of 5 strength with resisted forward flexion, abduction, internal rotation of the shoulder. Significant weakness with external rotation both with his arm adducted and abducted. Elisa Alfonso No pain with Figu[place]a. Negative empty can.   Positive [place] and crossarm adduction test.  Negative Neer, Yergason, and speeds test.  No significant laxity anteriorly or posteriorly. Negative Sharon and jerk test.  No apprehension with abduction external rotation of the shoulder. Motor and sensory intact distally in all distributions. Sensations intact to light touch over the lateral shoulder. Hand is warm well perfused. Palpable radial pulse. Spine:  Skin is intact about the neck. No asymmetry or deformity about the neck. Nontender palpation over the paraspinal muscles or spinous processes. No crepitus or step-off with palpation. Flexion extension intact without discomfort. Sidebending and rotation are symmetric side to side without any reproduction of his pain. Negative Spurling's. Radiology:  3 views of the right shoulder are negative for any fracture dislocations. XR Results (most recent):  Results from Appointment encounter on 06/08/22    XR SHOULDER RT AP/LAT MIN 2 V    Narrative  Three-view x-ray of the right shoulder reveals no evidence of fracture or dislocation. He does have some mild evidence of osteoarthritis of the acromioclavicular joint             ASSESSMENT/PLAN:    Long discussion was had with the patient regarding his history, physical exam, radiographical findings. While his radiographs do not show any significant degenerative changes specifically over the glenohumeral or acromioclavicular joint. While some of his complaints seem related to his [facility] joint which could be some [facility] arthrosis. More concerning is his decreased strength even against gravity with external rotation. Explained that rotator cuff tears are not common in a 49-year-old even though he did have some traumatic events. At this point would recommend getting an MRI to assess for any anatomic pathology. We will also build to rule out any type of cysts that could be impinging on his nerves to the rotator cuff musculature.   Explained to him that it is possible this test could come back normal and if such we can further evaluate and consider other type of testing such as looking at his neck or nerve studies. He is happy with this plan. We will go ahead and order his MRI of the right shoulder and he can schedule at his convenience. We will plan to see him back once has been completed to review it with him and discuss the findings further. He is encouraged to call or come back sooner with any other questions or concerns.         Umesh Daniel MD

## 2022-06-08 NOTE — LETTER
6/8/2022    Patient: Gogo Hendricks   YOB: 1990   Date of Visit: 6/8/2022     Dallas Dinero MD  [facility] [address] [id number]  Via In Basket    Dear Dallas Dinero MD,      Thank you for referring Mr. Narendra Ann to [facility] for evaluation. My notes for this consultation are attached. If you have questions, please do not hesitate to call me. I look forward to following your patient along with you.       Sincerely,    Edyta Welch MD

## 2022-06-20 ENCOUNTER — OFFICE VISIT (OUTPATIENT)
Dept: ORTHOPEDIC SURGERY | Age: 32
End: 2022-06-20
Payer: COMMERCIAL

## 2022-06-20 DIAGNOSIS — S46.011A TRAUMATIC INCOMPLETE TEAR OF RIGHT ROTATOR CUFF, INITIAL ENCOUNTER: Primary | ICD-10-CM

## 2022-06-20 PROCEDURE — 99214 OFFICE O/P EST MOD 30 MIN: CPT | Performed by: ORTHOPAEDIC SURGERY

## 2022-06-20 NOTE — PROGRESS NOTES
Alfonso Shen (: 1990) is a 32 y.o. male, patient, here for evaluation of the following chief complaint(s):  Shoulder Pain (right shoulder MRI results)       HPI:    Patient returns to the office with recurrent discomfort of the right shoulder. He is now status post MRI. No Known Allergies    Current Outpatient Medications   Medication Sig    finasteride (PROPECIA) 1 mg tablet Take 1 mg by mouth daily. No current facility-administered medications for this visit. Past Medical History:   Diagnosis Date    ADD (attention deficit disorder with hyperactivity) 2010    Depressive disorder, not elsewhere classified 2010    Epididymitis 2010    Other acne 2010    Seizure disorder ([facility] [address] 2010    Unspecified asthma(493.90) 2010        No past surgical history on file.     Family History   Problem Relation Age of Onset    Headache Mother     Depression Father     Depression Brother     Hypertension Maternal Grandmother     Hypertension Maternal Grandfather     COPD Maternal Grandfather         Social History     Socioeconomic History    Marital status: SINGLE     Spouse name: Not on file    Number of children: Not on file    Years of education: Not on file    Highest education level: Not on file   Occupational History    Not on file   Tobacco Use    Smoking status: Former Smoker     Packs/day: 0.25     Types: Cigarettes     Start date:     Smokeless tobacco: Never Used   Vaping Use    Vaping Use: Never used   Substance and Sexual Activity    Alcohol use: No    Drug use: No    Sexual activity: Never   Other Topics Concern    Not on file   Social History Narrative    Not on file     Social Determinants of Health     Financial Resource Strain:     Difficulty of Paying Living Expenses: Not on file   Food Insecurity:     Worried About [address] in the Last Year: Not on file    Isaac of Food in the Last Year: Not on file Transportation Needs:     Lack of Transportation (Medical): Not on file    Lack of Transportation (Non-Medical): Not on file   Physical Activity:     Days of Exercise per Week: Not on file    Minutes of Exercise per Session: Not on file   Stress:     Feeling of Stress : Not on file   Social Connections:     Frequency of Communication with Friends and Family: Not on file    Frequency of Social Gatherings with Friends and Family: Not on file    Attends Orthodox Services: Not on file    Active Member of [address] [place] [address] or Organizations: Not on file    Attends Club or Organization Meetings: Not on file    Marital Status: Not on file   Intimate Partner Violence:     Fear of Current or Ex-Partner: Not on file    Emotionally Abused: Not on file    Physically Abused: Not on file    Sexually Abused: Not on file   Housing Stability:     Unable to Pay for Housing in the Last Year: Not on file    Number of [place] in the Last Year: Not on file    Unstable Housing in the Last Year: Not on file       Review of Systems   Musculoskeletal:        Right shoulder MRI results       Vitals: There were no vitals taken for this visit. There is no height or weight on file to calculate BMI. Ortho Exam     Right upper extremity:  Skin is intact about the shoulder. No deformity or atrophy about the shoulder girdle. Checking the contour of his shoulders and his periscapular region no obvious atrophy or deformity. There is no ecchymosis, lacerations, lesions, erythema, warmth, or signs of infection. Mild tenderness over the [facility] joint. Nontender over the clavicle, acromion, spine scapula, long head of bicep. He is also nontender over his biceps muscle, deltoid muscle, triceps muscle, elbow, wrist, forearm, hand or fingers. Actively he is able to forward flex to 170 degrees and abduct about 170 degrees. He can externally rotate 80 degrees and internally rotate to his mid thoracic spine.   There is palpable crepitus at his AC joint with active range of motion of the shoulder. He has 5 out of 5 strength with resisted forward flexion, abduction, internal rotation of the shoulder. Significant weakness with external rotation both with his arm adducted and abducted. Ezequiel Fletcher No pain with [place]. Negative empty can. Positive [place] and crossarm adduction test.  Negative Neer, Yergason, and speeds test.  No significant laxity anteriorly or posteriorly. Negative Sharon and jerk test.  No apprehension with abduction external rotation of the shoulder. Motor and sensory intact distally in all distributions. Sensations intact to light touch over the lateral shoulder. Hand is warm well perfused. Palpable radial pulse. MRI evaluation shows evidence of a high-grade partial-thickness rotator cuff tendon tear in the center portion of the supraspinatus      ASSESSMENT/PLAN:    I have gone over the MRI. We discussed operative versus nonoperative management. Understanding the risk and benefits of both, he is leaning more toward surgical intervention. I think it is reasonable and appropriate to consider this. I went over the surgery in great detail. We discussed the technique. We discussed the risks and benefits. I have also gone over the expectations. Patient would like to proceed with surgery in the near future. The risks and benefits were described to the patient. Patient understands there is a risk of infection, postoperative pain, numbness, tingling, stiffness MI, DVT, PE, and other unforeseen events. The patient also understands there is a long rehabilitative process that typically follows the surgical procedure. We talked about the possibility of not being able to alleviate all of the discomfort. Also, I explained  there is no guarantee all the function and strength will return. The patient also understands the risks of re-tear or failure to heal.  The patient understands implants may be utilized during this surgery.   The patient also understands the generalized, associated risk of anesthetic and wishes to proceed in an elective fashion.         Rashi Parada MD

## 2022-06-20 NOTE — LETTER
6/20/2022    Patient: Jojo Bell   YOB: 1990   Date of Visit: 6/20/2022     Rosalina Bustillos MD  [facility] [place] [address] [phone]  Via In Basket    Dear Rosalina Bustillos MD,      Thank you for referring Mr. Lary Humphreys to [facility] for evaluation. My notes for this consultation are attached. If you have questions, please do not hesitate to call me. I look forward to following your patient along with you.       Sincerely,    Rashi Parada MD

## 2022-06-21 DIAGNOSIS — S46.011A TRAUMATIC INCOMPLETE TEAR OF RIGHT ROTATOR CUFF, INITIAL ENCOUNTER: Primary | ICD-10-CM

## 2022-06-27 DIAGNOSIS — Z98.890 HISTORY OF ROTATOR CUFF SURGERY: Primary | ICD-10-CM

## 2022-06-28 RX ORDER — OXYCODONE AND ACETAMINOPHEN 5; 325 MG/1; MG/1
1 TABLET ORAL
Qty: 40 TABLET | Refills: 0 | Status: SHIPPED | OUTPATIENT
Start: 2022-06-28 | End: 2022-07-01

## 2022-07-01 DIAGNOSIS — Z98.890 HISTORY OF ROTATOR CUFF SURGERY: Primary | ICD-10-CM

## 2022-07-01 RX ORDER — OXYCODONE AND ACETAMINOPHEN 5; 325 MG/1; MG/1
1 TABLET ORAL
Qty: 40 TABLET | Refills: 0 | Status: SHIPPED | OUTPATIENT
Start: 2022-07-01 | End: 2022-07-08

## 2022-07-06 ENCOUNTER — OFFICE VISIT (OUTPATIENT)
Dept: ORTHOPEDIC SURGERY | Age: 32
End: 2022-07-06
Payer: COMMERCIAL

## 2022-07-06 DIAGNOSIS — Z98.890 HISTORY OF ROTATOR CUFF SURGERY: Primary | ICD-10-CM

## 2022-07-06 DIAGNOSIS — Z98.890 STATUS POST ROTATOR CUFF REPAIR: ICD-10-CM

## 2022-07-06 PROCEDURE — 99024 POSTOP FOLLOW-UP VISIT: CPT | Performed by: ORTHOPAEDIC SURGERY

## 2022-07-06 NOTE — PROGRESS NOTES
Caryn Reina (: 1990) is a 32 y.o. male, patient, here for evaluation of the following chief complaint(s):  Shoulder Pain (right shoulder post op)       HPI:    Patient returns the office now status post rotator cuff repair to the right. He is doing well. His pain is controlled. He is here today with his mother. He denies shortness of breath. No Known Allergies    Current Outpatient Medications   Medication Sig    oxyCODONE-acetaminophen (Percocet) 5-325 mg per tablet Take 1 Tablet by mouth every four (4) hours as needed for Pain for up to 7 days. Max Daily Amount: 6 Tablets.  finasteride (PROPECIA) 1 mg tablet Take 1 mg by mouth daily. No current facility-administered medications for this visit. Past Medical History:   Diagnosis Date    ADD (attention deficit disorder with hyperactivity) 2010    Depressive disorder, not elsewhere classified 2010    Epididymitis 2010    Other acne 2010    Seizure disorder ([facility] [address] 2010    Unspecified asthma(493.90) 2010        No past surgical history on file.     Family History   Problem Relation Age of Onset    Headache Mother     Depression Father     Depression Brother     Hypertension Maternal Grandmother     Hypertension Maternal Grandfather     COPD Maternal Grandfather         Social History     Socioeconomic History    Marital status: SINGLE     Spouse name: Not on file    Number of children: Not on file    Years of education: Not on file    Highest education level: Not on file   Occupational History    Not on file   Tobacco Use    Smoking status: Former Smoker     Packs/day: 0.25     Types: Cigarettes     Start date:     Smokeless tobacco: Never Used   Vaping Use    Vaping Use: Never used   Substance and Sexual Activity    Alcohol use: No    Drug use: No    Sexual activity: Never   Other Topics Concern    Not on file   Social History Narrative    Not on file     Social Determinants of Health     Financial Resource Strain:     Difficulty of Paying Living Expenses: Not on file   Food Insecurity:     Worried About Running Out of Food in the Last Year: Not on file    Isaac of Food in the Last Year: Not on file   Transportation Needs:     Lack of Transportation (Medical): Not on file    Lack of Transportation (Non-Medical): Not on file   Physical Activity:     Days of Exercise per Week: Not on file    Minutes of Exercise per Session: Not on file   Stress:     Feeling of Stress : Not on file   Social Connections:     Frequency of Communication with Friends and Family: Not on file    Frequency of Social Gatherings with Friends and Family: Not on file    Attends Alevism Services: Not on file    Active Member of [address] [place] [address] [employer] or Organizations: Not on file    Attends Club or Organization Meetings: Not on file    Marital Status: Not on file   Intimate Partner Violence:     Fear of Current or Ex-Partner: Not on file    Emotionally Abused: Not on file    Physically Abused: Not on file    Sexually Abused: Not on file   Housing Stability:     Unable to Pay for Housing in the Last Year: Not on file    Number of [place] in the Last Year: Not on file    Unstable Housing in the Last Year: Not on file       Review of Systems   Musculoskeletal:        Right shoulder post op       Vitals: There were no vitals taken for this visit. There is no height or weight on file to calculate BMI. Ortho Exam     Right shoulder: Portal sites are healing well. He has minimal to no ecchymosis. There is no swelling. He has full range of motion of his elbow and wrist distally. No active range of motion was attempted today. Neurovascular examination is intact. ASSESSMENT/PLAN:    Patient is doing well. Have gone over the restrictions. Have gone over the use of the sling. We have also gone over management of portal sites. Patient was given a prescription to advance into physical therapy. Patient is to return to the office in 4 weeks.         Leesa Tarango MD

## 2022-07-06 NOTE — LETTER
7/6/2022    Patient: Isabel Howard   YOB: 1990   Date of Visit: 7/6/2022     Shane Shipman MD  [place] [phone]  Via In Basket    Dear Shane Shipman MD,      Thank you for referring Mr. Shelby Summers to [facility] for evaluation. My notes for this consultation are attached. If you have questions, please do not hesitate to call me. I look forward to following your patient along with you.       Sincerely,    Niurka Spencer MD

## 2022-07-18 ENCOUNTER — DOCUMENTATION ONLY (OUTPATIENT)
Dept: ORTHOPEDIC SURGERY | Age: 32
End: 2022-07-18

## 2022-07-18 NOTE — PROGRESS NOTES
PT order, OP notes, and office notes prior to surgery faxed to [facility] nurse  on 07/15/2022

## 2022-08-08 ENCOUNTER — OFFICE VISIT (OUTPATIENT)
Dept: ORTHOPEDIC SURGERY | Age: 32
End: 2022-08-08
Payer: OTHER MISCELLANEOUS

## 2022-08-08 DIAGNOSIS — Z98.890 STATUS POST RIGHT ROTATOR CUFF REPAIR: Primary | ICD-10-CM

## 2022-08-08 PROCEDURE — 99024 POSTOP FOLLOW-UP VISIT: CPT | Performed by: ORTHOPAEDIC SURGERY

## 2022-08-08 NOTE — PROGRESS NOTES
Jackelyn Ness (: 1990) is a 32 y.o. male, patient, here for evaluation of the following chief complaint(s):  Shoulder Pain (Patient here for post op shoulder.)       HPI:    Patient returns to the office now status post rotator cuff repair to the right. He is here today with his mother as well as a . He is doing well. Has been attending physical therapy on a regular basis. No Known Allergies    Current Outpatient Medications   Medication Sig    finasteride (PROPECIA) 1 mg tablet Take 1 mg by mouth daily. No current facility-administered medications for this visit. Past Medical History:   Diagnosis Date    ADD (attention deficit disorder with hyperactivity) 2010    Depressive disorder, not elsewhere classified 2010    Epididymitis 2010    Other acne 2010    Seizure disorder ([facility] [address] 2010    Unspecified asthma(493.90) 2010        No past surgical history on file.     Family History   Problem Relation Age of Onset    Headache Mother     Depression Father     Depression Brother     Hypertension Maternal Grandmother     Hypertension Maternal Grandfather     COPD Maternal Grandfather         Social History     Socioeconomic History    Marital status: SINGLE     Spouse name: Not on file    Number of children: Not on file    Years of education: Not on file    Highest education level: Not on file   Occupational History    Not on file   Tobacco Use    Smoking status: Former     Packs/day: 0.25     Types: Cigarettes     Start date:     Smokeless tobacco: Never   Vaping Use    Vaping Use: Never used   Substance and Sexual Activity    Alcohol use: No    Drug use: No    Sexual activity: Never   Other Topics Concern    Not on file   Social History Narrative    Not on file     Social Determinants of Health     Financial Resource Strain: Not on file   Food Insecurity: Not on file   Transportation Needs: Not on file   Physical Activity: Not on file   Stress: Not on file   Social Connections: Not on file   Intimate Partner Violence: Not on file   Housing Stability: Not on file       Review of Systems   Musculoskeletal:         Patient here for post op shoulder     Vitals: There were no vitals taken for this visit. There is no height or weight on file to calculate BMI. Ortho Exam     Right shoulder: Portal sites of healed. Patient has 130 degrees of forward elevation, 100 kali in abduction and 60 degrees of external rotation. No strength testing was performed today. Neurovascular examination is intact. ASSESSMENT/PLAN:    Patient is progressing well. I would recommend advancing physical therapy at this stage. I have gone over the restrictions moving forward. I have written a prescription for physical therapy and for work duty restrictions.   Patient is to return to the office in 4 weeks        Debbi Pandya MD

## 2022-08-08 NOTE — LETTER
8/8/2022    Patient: Margarita Alva   YOB: 1990   Date of Visit: 8/8/2022     Sterling Steele MD  [address] [phone]  Via In Basket    Dear Sterling Steele MD,      Thank you for referring Mr. Devan Beauchamp to [facility] for evaluation. My notes for this consultation are attached. If you have questions, please do not hesitate to call me. I look forward to following your patient along with you.       Sincerely,    Miguel Jones MD

## 2022-09-07 ENCOUNTER — OFFICE VISIT (OUTPATIENT)
Dept: ORTHOPEDIC SURGERY | Age: 32
End: 2022-09-07
Payer: COMMERCIAL

## 2022-09-07 DIAGNOSIS — Z98.890 STATUS POST RIGHT ROTATOR CUFF REPAIR: Primary | ICD-10-CM

## 2022-09-07 PROCEDURE — 99024 POSTOP FOLLOW-UP VISIT: CPT | Performed by: ORTHOPAEDIC SURGERY

## 2022-09-07 NOTE — PROGRESS NOTES
Jackelyn Ness (: 1990) is a 32 y.o. male, patient, here for evaluation of the following chief complaint(s):  Shoulder Pain (Right shoulder pain)       HPI:    Patient presents to the office now status post rotator cuff repair to the right. He is here today with nurse manager. He states is doing well. His pain is controlled. No Known Allergies    Current Outpatient Medications   Medication Sig    finasteride (PROPECIA) 1 mg tablet Take 1 mg by mouth daily. No current facility-administered medications for this visit. Past Medical History:   Diagnosis Date    ADD (attention deficit disorder with hyperactivity) 2010    Depressive disorder, not elsewhere classified 2010    Epididymitis 2010    Other acne 2010    Seizure disorder ([facility] [address] 2010    Unspecified asthma(493.90) 2010        No past surgical history on file.     Family History   Problem Relation Age of Onset    Headache Mother     Depression Father     Depression Brother     Hypertension Maternal Grandmother     Hypertension Maternal Grandfather     COPD Maternal Grandfather         Social History     Socioeconomic History    Marital status: SINGLE     Spouse name: Not on file    Number of children: Not on file    Years of education: Not on file    Highest education level: Not on file   Occupational History    Not on file   Tobacco Use    Smoking status: Former     Packs/day: 0.25     Types: Cigarettes     Start date:     Smokeless tobacco: Never   Vaping Use    Vaping Use: Never used   Substance and Sexual Activity    Alcohol use: No    Drug use: No    Sexual activity: Never   Other Topics Concern    Not on file   Social History Narrative    Not on file     Social Determinants of Health     Financial Resource Strain: Not on file   Food Insecurity: Not on file   Transportation Needs: Not on file   Physical Activity: Not on file   Stress: Not on file   Social Connections: Not on file   Intimate Partner Violence: Not on file   Housing Stability: Not on file       Review of Systems   Musculoskeletal:         Right shoulder pain     Vitals: There were no vitals taken for this visit. There is no height or weight on file to calculate BMI. Ortho Exam     Right shoulder: Portal sites of healed. He has 165 degrees of forward elevation, 130 degrees on abduction and 60 degrees of external rotation internal rotation is to his SI joint. He has minimal pain with manipulation of the shoulder. His strength is coming along very well. ASSESSMENT/PLAN:    Patient is progressing well. I would recommend continuing with physical therapy as we advance into the strengthening portion of his therapy. We did talk about his return to work. He states at times he has to lift over 100 pounds. He is 32years of age and is already sustained a rotator cuff injury. I would not recommend returning back to over 100 pound lifting at work. This will most likely recreate or further damage his shoulder. He is still very employable. 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   He is to return to the office in 4 weeks        Shraddha Pruitt MD

## 2022-09-07 NOTE — LETTER
9/7/2022    Patient: Purvi Solorio   YOB: 1990   Date of Visit: 9/7/2022     Rogelio Donnelly MD  [place] [phone]  Via In Basket    Dear Rogelio Donnelly MD,      Thank you for referring Mr. Eze Fowler to [facility] for evaluation. My notes for this consultation are attached. If you have questions, please do not hesitate to call me. I look forward to following your patient along with you.       Sincerely,    Amalia Cleaning MD

## 2022-09-20 ENCOUNTER — VIRTUAL VISIT (OUTPATIENT)
Dept: FAMILY MEDICINE CLINIC | Age: 32
End: 2022-09-20
Payer: COMMERCIAL

## 2022-09-20 DIAGNOSIS — J02.9 SORE THROAT: Primary | ICD-10-CM

## 2022-09-20 DIAGNOSIS — R50.9 FEVER, UNSPECIFIED FEVER CAUSE: ICD-10-CM

## 2022-09-20 DIAGNOSIS — R05.9 COUGH: ICD-10-CM

## 2022-09-20 PROCEDURE — 99213 OFFICE O/P EST LOW 20 MIN: CPT | Performed by: FAMILY MEDICINE

## 2022-09-20 NOTE — PROGRESS NOTES
Started yesterday AM w/ scratchy/sore throat, dull low grade headache, body aches, fever 99.9, chills, nasal congestion, left earache, runny nose, cough that is mostly non-productive but today green sputum    In home Covid test negative yesterday  Taking Advil Severe Cold & Flu and Sudafed prn and saline nasal spray   Feels better today compared to yesterday    2 yo nephew staying w/ him and parents right now and he has runny nose but they have not tested him for Covid.      No travel

## 2022-09-20 NOTE — PROGRESS NOTES
VIRTUAL VISIT    Patient seen via virtual visit today for the following:  Chief Complaint   Patient presents with    Nasal Congestion    Cough    Fever    Chills    Sore Throat       HISTORY OF PRESENT ILLNESS   HPI  Started yesterday AM w/ scratchy/sore throat, dull low grade headache, body aches, fever 99.9, chills, nasal congestion, left earache, runny nose, cough that is mostly non-productive but today got a bit of green sputum and is wondering if he should just go on an antibiotic. He did an in-home rapid Covid test yesterday which was reportedly negative. He has been taking Advil Severe Cold & Flu and Sudafed prn and using saline nasal spray. Feels alittle better today compared to yesterday. His 2 yo nephew is temporarily staying w/ him and parents right now and he has runny nose and cold symptoms, but they have not tested him for Covid. Patient denies recent travel. He has been vaccinated against Covid x 2 back in the spring of 2021 but not boosted. Has not gotten a flu test.      REVIEW OF SYMPTOMS   Review of Systems   Constitutional:  Positive for chills and fever. HENT:  Positive for congestion, ear pain and sore throat. Respiratory:  Positive for cough. Negative for shortness of breath and wheezing. Cardiovascular: Negative. Gastrointestinal: Negative. Neurological:  Positive for headaches. PROBLEM LIST/MEDICAL HISTORY     Problem List  Date Reviewed: 9/20/2022            Codes Class Noted    Obesity, Class I, BMI 30-34.9 ICD-10-CM: E66.9  ICD-9-CM: 278.00  6/8/2016        Marijuana smoker ICD-10-CM: F12.90  ICD-9-CM: 305.20  2/29/2016        Depression ICD-10-CM: F32. A  ICD-9-CM: 057  10/11/2012        ADHD (attention deficit hyperactivity disorder) (Chronic) ICD-10-CM: F90.9  ICD-9-CM: 314.01  2/26/2010        Unspecified asthma(493.90) ICD-10-CM: J45.909  ICD-9-CM: 493.90  2/26/2010        Other acne ICD-10-CM: L70.8  ICD-9-CM: 706.1  2/26/2010        Epididymitis ICD-10-CM: N45.1  ICD-9-CM: 604.90  2/26/2010        Seizure disorder [facility]) ICD-10-CM: G40.909  ICD-9-CM: 345.90  2/26/2010               PAST SURGICAL HISTORY   No past surgical history on file. MEDICATIONS     Current Outpatient Medications   Medication Sig    finasteride (PROPECIA) 1 mg tablet Take 1 mg by mouth daily. No current facility-administered medications for this visit. ALLERGIES   No Known Allergies       SOCIAL HISTORY     Social History     Tobacco Use    Smoking status: Former     Packs/day: 0.25     Types: Cigarettes     Start date: 2017    Smokeless tobacco: Never   Substance Use Topics    Alcohol use: No     Social History     Social History Narrative    Lives w/ mother and step-father     Social History     Substance and Sexual Activity   Sexual Activity Never       IMMUNIZATIONS     Immunization History   Administered Date(s) Administered    COVID-19, MODERNA BLUE border, Primary or Immunocompromised, (age 18y+), IM, 100 mcg/0.5mL 03/31/2021, 04/28/2021    DTAP Vaccine 02/26/1991, 04/30/1991, 07/02/1991, 04/14/1992, 08/12/1996    HIB Vaccine 10/24/2001, 11/29/2001, 05/01/2002    IPV 02/26/1991, 04/30/1991, 04/14/1992, 08/12/1996    Influenza Vaccine Whole 10/26/2006    MMR Vaccine 04/14/1992, 08/12/1996    Meningococcal Vaccine 06/12/2009    TDAP Vaccine 06/30/2009         FAMILY HISTORY     Family History   Problem Relation Age of Onset    Headache Mother     Depression Father     Depression Brother     Hypertension Maternal Grandmother     Hypertension Maternal Grandfather     COPD Maternal Grandfather          VITALS   There were no vitals available for today's virtual visit. Any patient self reported vitals are noted below:  No flowsheet data found. PHYSICAL EXAMINATION   Physical Exam  Constitutional:       General: He is not in acute distress. Pulmonary:      Effort: Pulmonary effort is normal. No respiratory distress.            ASSESSMENT & PLAN       ICD-10-CM ICD-9-CM 1. Sore throat  J02.9 462       2. Cough  R05.9 786.2       3. Fever, Chills, Aches R50.9 780.60         Patient seen via virtual visit today and was referred to urgent care for further evaluation and management. Need rule out flu, covid, strep or other focal infection. States understanding and will be going [email or web address] or [facility] this afternoon. Patient was evaluated through a synchronous (real-time) audio-video encounter. The patient (or guardian if applicable) is aware that this is a billable service. Verbal consent to proceed has been obtained within the past 12 months. The visit was conducted pursuant to the emergency declaration under the [address] [place] [address] and the [employer] Act. Patient identification was verified, and a caregiver was present when appropriate. The patient was located in a state where the provider was credentialed to provide care. Ana Chapin, was evaluated through a synchronous (real-time) audio-video encounter. The patient (or guardian if applicable) is aware that this is a billable service, which includes applicable co-pays. This Virtual Visit was conducted with patient's (and/or legal guardian's) consent. The visit was conducted pursuant to the emergency declaration under the [address] [place] [address] and the Paperless [employer] Act. Patient identification was verified, and a caregiver was present when appropriate. The patient was located at: Home: [place] [phone]  The provider was located at: [facility] Jayy Dietz MD on 9/20/2022 at 1:18 PM    An electronic signature was used to authenticate this note. Total Time: minutes: 20 .   An electronic signature was used to authenticate this note.   ~Adrienne Barrera MD~

## 2022-10-05 ENCOUNTER — OFFICE VISIT (OUTPATIENT)
Dept: ORTHOPEDIC SURGERY | Age: 32
End: 2022-10-05
Payer: COMMERCIAL

## 2022-10-05 DIAGNOSIS — Z98.890 STATUS POST RIGHT ROTATOR CUFF REPAIR: Primary | ICD-10-CM

## 2022-10-05 DIAGNOSIS — G89.29 CHRONIC RIGHT SHOULDER PAIN: ICD-10-CM

## 2022-10-05 DIAGNOSIS — M25.511 CHRONIC RIGHT SHOULDER PAIN: ICD-10-CM

## 2022-10-05 PROCEDURE — 99212 OFFICE O/P EST SF 10 MIN: CPT | Performed by: ORTHOPAEDIC SURGERY

## 2022-10-05 NOTE — PROGRESS NOTES
Dee Chairez (: 1990) is a 32 y.o. male, patient, here for evaluation of the following chief complaint(s):  Shoulder Pain (Right shoulder pain)       HPI:    This is a patient status post right shoulder rotator cuff repair. Is been progressing well physical therapy where he feels like he is regaining majority of his range of motion although does have occasional stiffness and catching in the shoulder. States that although his range of motion is returning still having difficulty with lifting any weight overhead. His job would require him to not only lift heavy objects at work overhead on a regular basis which she feels he is not able to do at this time. No Known Allergies    Current Outpatient Medications   Medication Sig    finasteride (PROPECIA) 1 mg tablet Take 1 mg by mouth daily. No current facility-administered medications for this visit. Past Medical History:   Diagnosis Date    ADD (attention deficit disorder with hyperactivity) 2010    Depressive disorder, not elsewhere classified 2010    Epididymitis 2010    Other acne 2010    Seizure disorder ([facility] [address] 2010    Unspecified asthma(493.90) 2010        No past surgical history on file.     Family History   Problem Relation Age of Onset    Headache Mother     Depression Father     Depression Brother     Hypertension Maternal Grandmother     Hypertension Maternal Grandfather     COPD Maternal Grandfather         Social History     Socioeconomic History    Marital status: SINGLE     Spouse name: Not on file    Number of children: Not on file    Years of education: Not on file    Highest education level: Not on file   Occupational History    Occupation: Property Maintenance Work   Tobacco Use    Smoking status: Former     Packs/day: 0.25     Types: Cigarettes     Start date:     Smokeless tobacco: Never   Vaping Use    Vaping Use: Never used   Substance and Sexual Activity    Alcohol use: No    Drug use: No    Sexual activity: Never   Other Topics Concern    Not on file   Social History Narrative    Lives w/ mother and step-father     Social Determinants of Health     Financial Resource Strain: Not on file   Food Insecurity: Not on file   Transportation Needs: Not on file   Physical Activity: Not on file   Stress: Not on file   Social Connections: Not on file   Intimate Partner Violence: Not on file   Housing Stability: Not on file       Review of Systems   Musculoskeletal:         Right shoulder post op     Vitals: There were no vitals taken for this visit. There is no height or weight on file to calculate BMI. Ortho Exam     Right shoulder: No shoulder girdle atrophy . There is no soft tissue swelling, ecchymosis, abrasions or lacerations. He is returned very close to full range of motion, he has lateral abduction 130 degrees forward flexion 130 degrees with slight amount of stiffness at his endpoint. External rotation is 60 degrees and equal to contralateral side. Internal rotation to his lower lumbar spine. Negative impingement sign and a negative Figueroa sign. Rotator cuff strength with forward flexion, lateral abduction and external rotation is intact with 5/5 strength. There is no crepitation about the joint. Palpation of the [facility] joint does not reproduce discomfort, and there is no pain elicited with cross-body adduction. Strength of the extremity is 5/5 at biceps/triceps/wrist extension. DRT's are intact at +2/4 and  symmetrical.  Cervical range of motion is full with no pain to palpation along the paraspinal musculature medial border of the scapula. Spurling's sign is negative. ASSESSMENT/PLAN:    Neck is doing very well status post right shoulder rotator cuff repair. He does have an incredibly physical job however and I think he is ready to return to full duty work given his requirement for overhead work as well as heavy lifting. He is getting good benefits out of physical therapy.   I would continue doing physical therapy for the next 4 weeks. We will put him on a restriction of no overhead activity at work as well as a 25 pound weight restriction. I will see him back in 4 weeks following this round of physical therapy, hopefully will be able to return him to full duty at that time.         Tosha Jose MD

## 2022-10-05 NOTE — LETTER
10/5/2022    Patient: Heaven Dowling   YOB: 1990   Date of Visit: 10/5/2022     Darlene Renee MD  [place] [phone]  Via In Basket    Dear Darlene Renee MD,      Thank you for referring Mr. Aziza Mcclure to [facility] for evaluation. My notes for this consultation are attached. If you have questions, please do not hesitate to call me. I look forward to following your patient along with you.       Sincerely,    Ag Mccullough MD

## 2022-10-07 ENCOUNTER — DOCUMENTATION ONLY (OUTPATIENT)
Dept: ORTHOPEDIC SURGERY | Age: 32
End: 2022-10-07

## 2022-10-07 NOTE — PROGRESS NOTES
10/05/22 office notes, [address], and PT referral faxed to [address] [place] [address] , Patrica Omalley, [phone], on 10/06/22

## 2022-11-02 ENCOUNTER — OFFICE VISIT (OUTPATIENT)
Dept: ORTHOPEDIC SURGERY | Age: 32
End: 2022-11-02
Payer: OTHER MISCELLANEOUS

## 2022-11-02 DIAGNOSIS — Z98.890 STATUS POST RIGHT ROTATOR CUFF REPAIR: Primary | ICD-10-CM

## 2022-11-02 DIAGNOSIS — M25.511 CHRONIC RIGHT SHOULDER PAIN: ICD-10-CM

## 2022-11-02 DIAGNOSIS — G89.29 CHRONIC RIGHT SHOULDER PAIN: ICD-10-CM

## 2022-11-02 PROCEDURE — 99212 OFFICE O/P EST SF 10 MIN: CPT | Performed by: ORTHOPAEDIC SURGERY

## 2022-11-02 NOTE — PROGRESS NOTES
Nataly Bautista (: 1990) is a 32 y.o. male, patient, here for evaluation of the following chief complaint(s):  Shoulder Pain (Right shoulder pain)       HPI:    Patient returns to the office now status post rotator cuff repair to the right. He is doing well. He is here today with a . He seems to be progressing well with physical therapy. No Known Allergies    Current Outpatient Medications   Medication Sig    finasteride (PROPECIA) 1 mg tablet Take 1 mg by mouth daily. No current facility-administered medications for this visit. Past Medical History:   Diagnosis Date    ADD (attention deficit disorder with hyperactivity) 2010    Depressive disorder, not elsewhere classified 2010    Epididymitis 2010    Other acne 2010    Seizure disorder ([address] 2010    Unspecified asthma(493.90) 2010        No past surgical history on file.     Family History   Problem Relation Age of Onset    Headache Mother     Depression Father     Depression Brother     Hypertension Maternal Grandmother     Hypertension Maternal Grandfather     COPD Maternal Grandfather         Social History     Socioeconomic History    Marital status: SINGLE     Spouse name: Not on file    Number of children: Not on file    Years of education: Not on file    Highest education level: Not on file   Occupational History    Occupation: Property Maintenance Work   Tobacco Use    Smoking status: Former     Packs/day: 0.25     Types: Cigarettes     Start date:     Smokeless tobacco: Never   Vaping Use    Vaping Use: Never used   Substance and Sexual Activity    Alcohol use: No    Drug use: No    Sexual activity: Never   Other Topics Concern    Not on file   Social History Narrative    Lives w/ mother and step-father     Social Determinants of Health     Financial Resource Strain: Not on file   Food Insecurity: Not on file   Transportation Needs: Not on file   Physical Activity: Not on file Stress: Not on file   Social Connections: Not on file   Intimate Partner Violence: Not on file   Housing Stability: Not on file       Review of Systems   Musculoskeletal:         Right shoulder pain     Vitals: There were no vitals taken for this visit. There is no height or weight on file to calculate BMI. Ortho Exam     Right shoulder: Patient has near full range of motion aside from a small loss of internal rotation. He has negative impingement sign. Shows a small amount of weakness with supraspinatus testing. Otherwise his strength is coming along very well. ASSESSMENT/PLAN:    Patient is doing well. At this stage, it is reasonable and appropriate to consider work conditioning. He has to do a lot of heavy lifting with his job. Patient agrees with this. I have written a prescription for it. He is to return to the office in 4-5 weeks.         Marixa Willams MD

## 2022-11-02 NOTE — LETTER
11/2/2022    Patient: Velia Goldstein   YOB: 1990   Date of Visit: 11/2/2022     Javier Jaime MD  [address] [facility] [address]  Via In Basket    Dear Javier Jaime MD,      Thank you for referring Mr. Shannan Hernandez to [facility] for evaluation. My notes for this consultation are attached. If you have questions, please do not hesitate to call me. I look forward to following your patient along with you.       Sincerely,    Evy Ruiz MD

## 2022-11-09 ENCOUNTER — TELEPHONE (OUTPATIENT)
Dept: ORTHOPEDIC SURGERY | Age: 32
End: 2022-11-09

## 2022-11-09 DIAGNOSIS — Z98.890 STATUS POST RIGHT ROTATOR CUFF REPAIR: Primary | ICD-10-CM

## 2022-11-09 NOTE — TELEPHONE ENCOUNTER
Phone call from patient inquiring about his work conditioning. He said he did not really want to go thru with work hardening, he didn't feel it was necessary, he had spoken with his therapist and they agreed it wasn't necessary. Dr Marybel Martinez made a phone call to patient and they agreed he would just return to regular PT.  A prescription was faxed to [address] [place] [address]

## 2022-12-06 ENCOUNTER — OFFICE VISIT (OUTPATIENT)
Dept: ORTHOPEDIC SURGERY | Age: 32
End: 2022-12-06
Payer: OTHER MISCELLANEOUS

## 2022-12-06 DIAGNOSIS — M25.511 RIGHT SHOULDER PAIN, UNSPECIFIED CHRONICITY: ICD-10-CM

## 2022-12-06 DIAGNOSIS — Z98.890 STATUS POST RIGHT ROTATOR CUFF REPAIR: Primary | ICD-10-CM

## 2022-12-06 PROCEDURE — 99213 OFFICE O/P EST LOW 20 MIN: CPT | Performed by: ORTHOPAEDIC SURGERY

## 2022-12-06 NOTE — PROGRESS NOTES
Philip Bernal (: 1990) is a 32 y.o. male, patient, here for evaluation of the following chief complaint(s):  Shoulder Pain (Right shoulder post op)       HPI:    Patient presents to the office now status post rotator cuff repair to the right shoulder. Last time I saw the patient in the office we had decided to proceed with work hardening program.  He subsequently called me back and wanted to discuss this. He did not want to continue with her work hardening. He stated he probably was not going back to this line of work and therefore did not feel the need to consider work hardening. I thought this was actually a reasonable option and did recommend continuing physical therapy of which he did. However, he presents today stating he has had some increased shoulder discomfort and states he actually went back to his old job and performed a lot of his old duties. 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 He did develop some level of shoulder pain he did not develop any swelling or black and blue. He is here today with his     No Known Allergies    Current Outpatient Medications   Medication Sig    finasteride (PROPECIA) 1 mg tablet Take 1 mg by mouth daily. No current facility-administered medications for this visit. Past Medical History:   Diagnosis Date    ADD (attention deficit disorder with hyperactivity) 2010    Depressive disorder, not elsewhere classified 2010    Epididymitis 2010    Other acne 2010    Seizure disorder ([facility] [address] 2010    Unspecified asthma(493.90) 2010        No past surgical history on file.     Family History   Problem Relation Age of Onset    Headache Mother     Depression Father     Depression Brother     Hypertension Maternal Grandmother     Hypertension Maternal Grandfather     COPD Maternal Grandfather         Social History     Socioeconomic History    Marital status: SINGLE     Spouse name: Not on file    Number of children: Not on file    Years of education: Not on file    Highest education level: Not on file   Occupational History    Occupation: Property Maintenance Work   Tobacco Use    Smoking status: Former     Packs/day: 0.25     Types: Cigarettes     Start date: 2017    Smokeless tobacco: Never   Vaping Use    Vaping Use: Never used   Substance and Sexual Activity    Alcohol use: No    Drug use: No    Sexual activity: Never   Other Topics Concern    Not on file   Social History Narrative    Lives w/ mother and step-father     Social Determinants of Health     Financial Resource Strain: Not on file   Food Insecurity: Not on file   Transportation Needs: Not on file   Physical Activity: Not on file   Stress: Not on file   Social Connections: Not on file   Intimate Partner Violence: Not on file   Housing Stability: Not on file       Review of Systems   Musculoskeletal:         Right shoulder post op     Vitals: There were no vitals taken for this visit. There is no height or weight on file to calculate BMI. Ortho Exam     Right shoulder : Portal sites of healed. He has near full range of motion of the shoulder aside from a small loss of internal rotation. He has minimal pain to the superior arc of motion with no crepitation. His strength is maintained with forward elevation, lateral abduction and external rotation. Neurovascular examination is intact. ASSESSMENT/PLAN:      I do not detect any structural damage of the shoulder. In addition of this, it is not surprising he has developed some level of pain. I did not clear him to return back to full duty work and I explained this to the patient. He stated he wanted to test the shoulder to see how it would feel. This was not my recommendation. This was the whole reason why we suggested work conditioning so that he could prepare and progress to this level of activity.   While I do not appreciate any structural damage of the shoulder, he certainly is sore from this. I think at this stage, it would be best to proceed with an FCE. I believe at this stage we need to build some parameters and discussed some restrictions that very well could be permanent. Patient agrees with this. He is to return to the office after the FCE.         Zaynab Sosa MD

## 2022-12-06 NOTE — LETTER
12/6/2022    Patient: Juanita Arguelles   YOB: 1990   Date of Visit: 12/6/2022     Epifanio Sebastian MD  [place] [id number]  Via In Basket    Dear Epifanio Sebastian MD,      Thank you for referring Mr. Meredith Cross to [facility] for evaluation. My notes for this consultation are attached. If you have questions, please do not hesitate to call me. I look forward to following your patient along with you.       Sincerely,    Wil Maharaj MD

## 2022-12-21 ENCOUNTER — APPOINTMENT (OUTPATIENT)
Dept: PHYSICAL THERAPY | Age: 32
End: 2022-12-21

## 2023-01-04 ENCOUNTER — HOSPITAL ENCOUNTER (OUTPATIENT)
Dept: PHYSICAL THERAPY | Age: 33
End: 2023-01-04

## 2023-01-18 ENCOUNTER — OFFICE VISIT (OUTPATIENT)
Dept: ORTHOPEDIC SURGERY | Age: 33
End: 2023-01-18
Payer: OTHER MISCELLANEOUS

## 2023-01-18 DIAGNOSIS — M25.511 CHRONIC RIGHT SHOULDER PAIN: Primary | ICD-10-CM

## 2023-01-18 DIAGNOSIS — G89.29 CHRONIC RIGHT SHOULDER PAIN: Primary | ICD-10-CM

## 2023-01-18 PROCEDURE — 99213 OFFICE O/P EST LOW 20 MIN: CPT | Performed by: ORTHOPAEDIC SURGERY

## 2023-01-18 NOTE — LETTER
1/18/2023    Patient: Car Benavidez   YOB: 1990   Date of Visit: 1/18/2023     Princess Smith MD  [facility] [address] [phone]  Via In Basket    Dear Princess Smith MD,      Thank you for referring Mr. Alisha Monique to [facility] for evaluation. My notes for this consultation are attached. If you have questions, please do not hesitate to call me. I look forward to following your patient along with you.       Sincerely,    Marlena Bernabe MD

## 2023-01-18 NOTE — PROGRESS NOTES
Allison Hou (: 1990) is a 28 y.o. male, patient, here for evaluation of the following chief complaint(s):  Shoulder Pain (Right shoulder )       HPI:    Patient presents to the office today now status post FCE of the right shoulder. Patient states he still does note some level of on and off shoulder pain and he particularly notices this after repetitive utilization of the shoulder. No Known Allergies    Current Outpatient Medications   Medication Sig    finasteride (PROPECIA) 1 mg tablet Take 1 mg by mouth daily. No current facility-administered medications for this visit. Past Medical History:   Diagnosis Date    ADD (attention deficit disorder with hyperactivity) 2010    Depressive disorder, not elsewhere classified 2010    Epididymitis 2010    Other acne 2010    Seizure disorder ([facility] [address] 2010    Unspecified asthma(493.90) 2010        No past surgical history on file.     Family History   Problem Relation Age of Onset    Headache Mother     Depression Father     Depression Brother     Hypertension Maternal Grandmother     Hypertension Maternal Grandfather     COPD Maternal Grandfather         Social History     Socioeconomic History    Marital status: SINGLE     Spouse name: Not on file    Number of children: Not on file    Years of education: Not on file    Highest education level: Not on file   Occupational History    Occupation: Property Maintenance Work   Tobacco Use    Smoking status: Former     Packs/day: 0.25     Types: Cigarettes     Start date:     Smokeless tobacco: Never   Vaping Use    Vaping Use: Never used   Substance and Sexual Activity    Alcohol use: No    Drug use: No    Sexual activity: Never   Other Topics Concern    Not on file   Social History Narrative    Lives w/ mother and step-father     Social Determinants of Health     Financial Resource Strain: Not on file   Food Insecurity: Not on file   Transportation Needs: Not on file Physical Activity: Not on file   Stress: Not on file   Social Connections: Not on file   Intimate Partner Violence: Not on file   Housing Stability: Not on file       Review of Systems   Musculoskeletal:         Right shoulder     Vitals: There were no vitals taken for this visit. There is no height or weight on file to calculate BMI. Ortho Exam     Right shoulder: Patient has full range of motion of the shoulder. He has minimal to no pain to the superior arc of motion. Static testing reveals normal strength with forward elevation, lateral duction external rotation. No swelling noted distally. Neurovascular examination is intact. I have reviewed his FCE. The FCE shows that the patient is able to lift 60 pounds occasionally. He was rated because of this as a heavy PDL. States that his frequent lifting is 30 pounds and is constantly standing is 12 pounds      ASSESSMENT/PLAN:    I gone over the FCE. There is enough information on this FCE to release him back to work under the guidelines stated for lifting and carrying. However, there were no specifics on lifting from the ground to the knee, lifting from the knee to the waist, and lifting waist to the shoulder. Also there was no information of lifting overhead. These numbers are typically logged in a standard shoulder FCE and they were not listed in this FCE. If the employer needs more information to release this patient back to permanent restricted duty work, I would recommend a another FCE in a different location who is willing to complete all of those aspects of the FCE. If they can find a limited duty work that can abide by the carrying restrictions currently stated this is also reasonable. The  was available during this conversation and she understands as well.         Merlyn Rick MD

## 2023-03-06 ENCOUNTER — DOCUMENTATION ONLY (OUTPATIENT)
Dept: ORTHOPEDIC SURGERY | Age: 33
End: 2023-03-06